# Patient Record
Sex: MALE | Race: ASIAN | NOT HISPANIC OR LATINO
[De-identification: names, ages, dates, MRNs, and addresses within clinical notes are randomized per-mention and may not be internally consistent; named-entity substitution may affect disease eponyms.]

---

## 2017-01-03 ENCOUNTER — APPOINTMENT (OUTPATIENT)
Dept: UROLOGY | Facility: CLINIC | Age: 47
End: 2017-01-03

## 2017-01-04 LAB — ALP BLD-CCNC: 139 U/L

## 2017-01-09 LAB
ALP BLD-CCNC: 129 U/L
ALP BONE CFR SERPL: 35 %
ALP INTEST CFR SERPL: 13 %
ALP LIVER CFR SERPL: 52 %
ALP MACRO CFR SERPL: 0 %
ALP PLAC CFR SERPL: 0 %

## 2017-01-18 ENCOUNTER — RESULT REVIEW (OUTPATIENT)
Age: 47
End: 2017-01-18

## 2017-01-22 ENCOUNTER — EMERGENCY (EMERGENCY)
Facility: HOSPITAL | Age: 47
LOS: 1 days | Discharge: HOME | End: 2017-01-22
Attending: EMERGENCY MEDICINE | Admitting: EMERGENCY MEDICINE
Payer: MEDICAID

## 2017-01-22 VITALS
DIASTOLIC BLOOD PRESSURE: 90 MMHG | SYSTOLIC BLOOD PRESSURE: 125 MMHG | OXYGEN SATURATION: 100 % | RESPIRATION RATE: 16 BRPM | HEART RATE: 73 BPM | TEMPERATURE: 98 F

## 2017-01-22 VITALS
RESPIRATION RATE: 18 BRPM | SYSTOLIC BLOOD PRESSURE: 110 MMHG | DIASTOLIC BLOOD PRESSURE: 67 MMHG | OXYGEN SATURATION: 100 % | TEMPERATURE: 98 F | HEART RATE: 62 BPM

## 2017-01-22 DIAGNOSIS — Z98.89 OTHER SPECIFIED POSTPROCEDURAL STATES: Chronic | ICD-10-CM

## 2017-01-22 LAB
APPEARANCE UR: CLEAR — SIGNIFICANT CHANGE UP
BILIRUB UR-MCNC: NEGATIVE — SIGNIFICANT CHANGE UP
BLOOD UR QL VISUAL: NEGATIVE — SIGNIFICANT CHANGE UP
COLOR SPEC: SIGNIFICANT CHANGE UP
GLUCOSE UR-MCNC: NEGATIVE — SIGNIFICANT CHANGE UP
KETONES UR-MCNC: NEGATIVE — SIGNIFICANT CHANGE UP
LEUKOCYTE ESTERASE UR-ACNC: NEGATIVE — SIGNIFICANT CHANGE UP
MUCOUS THREADS # UR AUTO: SIGNIFICANT CHANGE UP
NITRITE UR-MCNC: NEGATIVE — SIGNIFICANT CHANGE UP
PH UR: 6.5 — SIGNIFICANT CHANGE UP (ref 4.6–8)
PROT UR-MCNC: NEGATIVE — SIGNIFICANT CHANGE UP
RBC CASTS # UR COMP ASSIST: SIGNIFICANT CHANGE UP (ref 0–?)
SP GR SPEC: 1.01 — SIGNIFICANT CHANGE UP (ref 1–1.03)
UROBILINOGEN FLD QL: NORMAL E.U. — SIGNIFICANT CHANGE UP (ref 0.1–0.2)

## 2017-01-22 PROCEDURE — 99284 EMERGENCY DEPT VISIT MOD MDM: CPT

## 2017-01-22 PROCEDURE — 76870 US EXAM SCROTUM: CPT | Mod: 26

## 2017-01-22 RX ORDER — IBUPROFEN 200 MG
600 TABLET ORAL ONCE
Qty: 0 | Refills: 0 | Status: COMPLETED | OUTPATIENT
Start: 2017-01-22 | End: 2017-01-22

## 2017-01-22 RX ADMIN — Medication 600 MILLIGRAM(S): at 13:12

## 2017-01-22 NOTE — ED PROVIDER NOTE - MEDICAL DECISION MAKING DETAILS
47yo male with left testicular pain for months, gets worse at night, described as vibrations. No associated sx, pt has seen urology for the same complaint, normal exam, will check UA, US testicle to r/o mass. Hx not consistent with torsion, or incarcerated hernia, or epidymidis. Pt instructed to f/u with his urologist. 45yo male with left testicular pain for months, gets worse at night, described as vibrations. No associated sx, pt has seen urology for the same complaint, normal exam, will check UA, US testicle to r/o mass. Hx not consistent with torsion, or incarcerated hernia, or epidymidis. Pt instructed to f/u with his urologist. US consistent with hydrocele, possible epidymitis, however pt without pain on right, only on left, pt already on bactrim for possible prostitits, will not tx, will give pt copies of labs/US, pt instructed to f/u with his urologist.

## 2017-01-22 NOTE — ED PROVIDER NOTE - PHYSICAL EXAMINATION
No testicular swelling, mild pain to palpation over left testicle, no erythema, no penile discharge, no palpable masses in scrotum, no palpable hernia.

## 2017-01-22 NOTE — ED ADULT TRIAGE NOTE - CHIEF COMPLAINT QUOTE
Pt has been having testicular swelling x 2 months, c/o testicular pain/swelling increasingly worse since last night. Denies abnormal discharge, but felt chills last night. Recently dx with bacterial infection in stomach and placed on Sucralfate, Protonix, Levofloxacin and Clarithromycin.

## 2017-01-22 NOTE — ED PROVIDER NOTE - OBJECTIVE STATEMENT
47yo male hx of BPH, hx of peptic ulcer s/p endoscopy last week, started on triple antibiotic therapy, seen by a urologist for testicular swelling, given bactrim p/w worsening testicular pain. He notes testicular pain worsening at night. it gets better during the day. No associated n/v/d, no pain with urination, no decreased flatus. He has a hx of bladder surgery for a stone. He notes that he is currently pain free, but gets the pain at night, when resting. He works as a , he hasn't taken anything for the pain.

## 2017-01-23 ENCOUNTER — APPOINTMENT (OUTPATIENT)
Dept: UROLOGY | Facility: CLINIC | Age: 47
End: 2017-01-23

## 2017-02-08 ENCOUNTER — OUTPATIENT (OUTPATIENT)
Dept: OUTPATIENT SERVICES | Facility: HOSPITAL | Age: 47
LOS: 1 days | End: 2017-02-08
Payer: MEDICAID

## 2017-02-08 ENCOUNTER — APPOINTMENT (OUTPATIENT)
Dept: UROLOGY | Facility: CLINIC | Age: 47
End: 2017-02-08

## 2017-02-08 DIAGNOSIS — Z98.89 OTHER SPECIFIED POSTPROCEDURAL STATES: Chronic | ICD-10-CM

## 2017-02-08 PROCEDURE — 76870 US EXAM SCROTUM: CPT

## 2017-02-09 LAB
APPEARANCE: CLEAR
BACTERIA: NEGATIVE
BILIRUBIN URINE: NEGATIVE
BLOOD URINE: NEGATIVE
CALCIUM OXALATE CRYSTALS: ABNORMAL
COLOR: YELLOW
GLUCOSE QUALITATIVE U: NORMAL MG/DL
HYALINE CASTS: 0 /LPF
KETONES URINE: NEGATIVE
LEUKOCYTE ESTERASE URINE: NEGATIVE
MICROSCOPIC-UA: NORMAL
NITRITE URINE: NEGATIVE
PH URINE: 5.5
PROTEIN URINE: NEGATIVE MG/DL
RED BLOOD CELLS URINE: 2 /HPF
SPECIFIC GRAVITY URINE: 1.02
SQUAMOUS EPITHELIAL CELLS: 0 /HPF
UROBILINOGEN URINE: NORMAL MG/DL
WHITE BLOOD CELLS URINE: 0 /HPF

## 2017-02-10 LAB — BACTERIA UR CULT: NORMAL

## 2017-02-13 LAB — CORE LAB FLUID CYTOLOGY: NORMAL

## 2017-02-16 DIAGNOSIS — N45.1 EPIDIDYMITIS: ICD-10-CM

## 2017-02-16 DIAGNOSIS — R74.8 ABNORMAL LEVELS OF OTHER SERUM ENZYMES: ICD-10-CM

## 2017-02-22 ENCOUNTER — APPOINTMENT (OUTPATIENT)
Dept: UROLOGY | Facility: CLINIC | Age: 47
End: 2017-02-22

## 2017-02-22 LAB
APPEARANCE: ABNORMAL
BACTERIA: NEGATIVE
BILIRUBIN URINE: NEGATIVE
BLOOD URINE: ABNORMAL
CALCIUM OXALATE CRYSTALS: ABNORMAL
COLOR: YELLOW
GLUCOSE QUALITATIVE U: NORMAL MG/DL
HYALINE CASTS: 0 /LPF
KETONES URINE: NEGATIVE
LEUKOCYTE ESTERASE URINE: NEGATIVE
MICROSCOPIC-UA: NORMAL
NITRITE URINE: NEGATIVE
PH URINE: 6
PROTEIN URINE: 30 MG/DL
RED BLOOD CELLS URINE: 3 /HPF
SPECIFIC GRAVITY URINE: 1.03
SQUAMOUS EPITHELIAL CELLS: 0 /HPF
UROBILINOGEN URINE: NORMAL MG/DL
WHITE BLOOD CELLS URINE: 1 /HPF

## 2017-02-24 LAB
BACTERIA UR CULT: NORMAL
CORE LAB FLUID CYTOLOGY: NORMAL

## 2017-03-08 ENCOUNTER — APPOINTMENT (OUTPATIENT)
Dept: UROLOGY | Facility: CLINIC | Age: 47
End: 2017-03-08

## 2017-03-08 RX ORDER — GABAPENTIN 300 MG/1
300 CAPSULE ORAL
Qty: 60 | Refills: 11 | Status: ACTIVE | COMMUNITY
Start: 2017-02-22 | End: 1900-01-01

## 2017-03-15 LAB
ALP BLD-CCNC: 138 U/L
ALP BLD-CCNC: 141 U/L
ALP BONE CFR SERPL: 39 %
ALP INTEST CFR SERPL: 17 %
ALP LIVER CFR SERPL: 45 %
ALP MACRO CFR SERPL: 0 %
ALP PLAC CFR SERPL: 0 %

## 2017-03-22 ENCOUNTER — APPOINTMENT (OUTPATIENT)
Dept: UROLOGY | Facility: CLINIC | Age: 47
End: 2017-03-22

## 2017-04-03 ENCOUNTER — APPOINTMENT (OUTPATIENT)
Dept: UROLOGY | Facility: CLINIC | Age: 47
End: 2017-04-03

## 2017-05-09 ENCOUNTER — EMERGENCY (EMERGENCY)
Facility: HOSPITAL | Age: 47
LOS: 1 days | Discharge: HOME | End: 2017-05-09
Attending: EMERGENCY MEDICINE | Admitting: EMERGENCY MEDICINE
Payer: MEDICAID

## 2017-05-09 VITALS
DIASTOLIC BLOOD PRESSURE: 83 MMHG | SYSTOLIC BLOOD PRESSURE: 124 MMHG | OXYGEN SATURATION: 99 % | HEART RATE: 77 BPM | RESPIRATION RATE: 18 BRPM | TEMPERATURE: 98 F

## 2017-05-09 VITALS
SYSTOLIC BLOOD PRESSURE: 127 MMHG | TEMPERATURE: 98 F | DIASTOLIC BLOOD PRESSURE: 86 MMHG | HEART RATE: 66 BPM | OXYGEN SATURATION: 100 % | RESPIRATION RATE: 16 BRPM

## 2017-05-09 DIAGNOSIS — Z98.89 OTHER SPECIFIED POSTPROCEDURAL STATES: Chronic | ICD-10-CM

## 2017-05-09 LAB
ALBUMIN SERPL ELPH-MCNC: 4.5 G/DL — SIGNIFICANT CHANGE UP (ref 3.3–5)
ALP SERPL-CCNC: 122 U/L — HIGH (ref 40–120)
ALT FLD-CCNC: 35 U/L — SIGNIFICANT CHANGE UP (ref 4–41)
AST SERPL-CCNC: 31 U/L — SIGNIFICANT CHANGE UP (ref 4–40)
BASOPHILS # BLD AUTO: 0.04 K/UL — SIGNIFICANT CHANGE UP (ref 0–0.2)
BASOPHILS NFR BLD AUTO: 0.4 % — SIGNIFICANT CHANGE UP (ref 0–2)
BILIRUB SERPL-MCNC: 0.4 MG/DL — SIGNIFICANT CHANGE UP (ref 0.2–1.2)
BUN SERPL-MCNC: 10 MG/DL — SIGNIFICANT CHANGE UP (ref 7–23)
CALCIUM SERPL-MCNC: 9.5 MG/DL — SIGNIFICANT CHANGE UP (ref 8.4–10.5)
CHLORIDE SERPL-SCNC: 99 MMOL/L — SIGNIFICANT CHANGE UP (ref 98–107)
CK MB BLD-MCNC: 1 — SIGNIFICANT CHANGE UP (ref 0–2.5)
CK MB BLD-MCNC: 1.49 NG/ML — SIGNIFICANT CHANGE UP (ref 1–6.6)
CK SERPL-CCNC: 150 U/L — SIGNIFICANT CHANGE UP (ref 30–200)
CO2 SERPL-SCNC: 24 MMOL/L — SIGNIFICANT CHANGE UP (ref 22–31)
CREAT SERPL-MCNC: 0.82 MG/DL — SIGNIFICANT CHANGE UP (ref 0.5–1.3)
EOSINOPHIL # BLD AUTO: 0.17 K/UL — SIGNIFICANT CHANGE UP (ref 0–0.5)
EOSINOPHIL NFR BLD AUTO: 1.5 % — SIGNIFICANT CHANGE UP (ref 0–6)
GLUCOSE SERPL-MCNC: 84 MG/DL — SIGNIFICANT CHANGE UP (ref 70–99)
HCT VFR BLD CALC: 43.8 % — SIGNIFICANT CHANGE UP (ref 39–50)
HGB BLD-MCNC: 15.1 G/DL — SIGNIFICANT CHANGE UP (ref 13–17)
IMM GRANULOCYTES NFR BLD AUTO: 0.4 % — SIGNIFICANT CHANGE UP (ref 0–1.5)
LYMPHOCYTES # BLD AUTO: 27.4 % — SIGNIFICANT CHANGE UP (ref 13–44)
LYMPHOCYTES # BLD AUTO: 3.09 K/UL — SIGNIFICANT CHANGE UP (ref 1–3.3)
MCHC RBC-ENTMCNC: 29.8 PG — SIGNIFICANT CHANGE UP (ref 27–34)
MCHC RBC-ENTMCNC: 34.5 % — SIGNIFICANT CHANGE UP (ref 32–36)
MCV RBC AUTO: 86.6 FL — SIGNIFICANT CHANGE UP (ref 80–100)
MONOCYTES # BLD AUTO: 0.72 K/UL — SIGNIFICANT CHANGE UP (ref 0–0.9)
MONOCYTES NFR BLD AUTO: 6.4 % — SIGNIFICANT CHANGE UP (ref 2–14)
NEUTROPHILS # BLD AUTO: 7.22 K/UL — SIGNIFICANT CHANGE UP (ref 1.8–7.4)
NEUTROPHILS NFR BLD AUTO: 63.9 % — SIGNIFICANT CHANGE UP (ref 43–77)
PLATELET # BLD AUTO: 265 K/UL — SIGNIFICANT CHANGE UP (ref 150–400)
PMV BLD: 10.1 FL — SIGNIFICANT CHANGE UP (ref 7–13)
POTASSIUM SERPL-MCNC: 4.1 MMOL/L — SIGNIFICANT CHANGE UP (ref 3.5–5.3)
POTASSIUM SERPL-SCNC: 4.1 MMOL/L — SIGNIFICANT CHANGE UP (ref 3.5–5.3)
PROT SERPL-MCNC: 8.3 G/DL — SIGNIFICANT CHANGE UP (ref 6–8.3)
RBC # BLD: 5.06 M/UL — SIGNIFICANT CHANGE UP (ref 4.2–5.8)
RBC # FLD: 13.1 % — SIGNIFICANT CHANGE UP (ref 10.3–14.5)
SODIUM SERPL-SCNC: 136 MMOL/L — SIGNIFICANT CHANGE UP (ref 135–145)
TROPONIN T SERPL-MCNC: < 0.06 NG/ML — SIGNIFICANT CHANGE UP (ref 0–0.06)
WBC # BLD: 11.28 K/UL — HIGH (ref 3.8–10.5)
WBC # FLD AUTO: 11.28 K/UL — HIGH (ref 3.8–10.5)

## 2017-05-09 PROCEDURE — 93010 ELECTROCARDIOGRAM REPORT: CPT

## 2017-05-09 PROCEDURE — 70450 CT HEAD/BRAIN W/O DYE: CPT | Mod: 26

## 2017-05-09 PROCEDURE — 99285 EMERGENCY DEPT VISIT HI MDM: CPT | Mod: 25

## 2017-05-09 RX ORDER — ACETAMINOPHEN 500 MG
650 TABLET ORAL ONCE
Qty: 0 | Refills: 0 | Status: DISCONTINUED | OUTPATIENT
Start: 2017-05-09 | End: 2017-05-09

## 2017-05-09 RX ORDER — METOCLOPRAMIDE HCL 10 MG
10 TABLET ORAL ONCE
Qty: 0 | Refills: 0 | Status: COMPLETED | OUTPATIENT
Start: 2017-05-09 | End: 2017-05-09

## 2017-05-09 RX ORDER — ACETAMINOPHEN 500 MG
650 TABLET ORAL ONCE
Qty: 0 | Refills: 0 | Status: COMPLETED | OUTPATIENT
Start: 2017-05-09 | End: 2017-05-09

## 2017-05-09 RX ADMIN — Medication 10 MILLIGRAM(S): at 17:31

## 2017-05-09 RX ADMIN — Medication 650 MILLIGRAM(S): at 17:31

## 2017-05-09 NOTE — ED PROVIDER NOTE - OBJECTIVE STATEMENT
47 yo M with no PMHx here for headache since last night accompanied by dizziness. Denies f/c, vision changes, sob, cp, n/v, palpitations, diaphoresis, n/v, abdominal pain, dysuria, paresthesias, weakness.

## 2017-05-09 NOTE — ED ADULT NURSE NOTE - OBJECTIVE STATEMENT
pt states he is a  and a few days ago was sleeping in the car and his body started shaking, he c.o. "something moving in my head" and he started sweating. states he has had anxiety attacks in the past but this is different. pt c.o. lt sided headache and feeling off balance. + nausea, denies vomiting and head trauma. alert and oriented. medicated as ordered.

## 2017-05-24 ENCOUNTER — APPOINTMENT (OUTPATIENT)
Dept: UROLOGY | Facility: CLINIC | Age: 47
End: 2017-05-24

## 2017-05-28 LAB
ALBUMIN SERPL ELPH-MCNC: 4.5 G/DL
ALP BLD-CCNC: 138 U/L
ALP BLD-CCNC: 144 U/L
ALP BONE CFR SERPL: 37 %
ALP INTEST CFR SERPL: 23 %
ALP LIVER CFR SERPL: 40 %
ALP MACRO CFR SERPL: 0 %
ALP PLAC CFR SERPL: 0 %
ALT SERPL-CCNC: 32 U/L
ANION GAP SERPL CALC-SCNC: 15 MMOL/L
AST SERPL-CCNC: 27 U/L
BILIRUB SERPL-MCNC: 0.2 MG/DL
BUN SERPL-MCNC: 8 MG/DL
CALCIUM SERPL-MCNC: 10 MG/DL
CHLORIDE SERPL-SCNC: 103 MMOL/L
CO2 SERPL-SCNC: 24 MMOL/L
CREAT SERPL-MCNC: 0.87 MG/DL
GLUCOSE SERPL-MCNC: 107 MG/DL
POTASSIUM SERPL-SCNC: 4.4 MMOL/L
PROT SERPL-MCNC: 8.1 G/DL
SODIUM SERPL-SCNC: 142 MMOL/L

## 2017-06-07 ENCOUNTER — APPOINTMENT (OUTPATIENT)
Dept: UROLOGY | Facility: CLINIC | Age: 47
End: 2017-06-07

## 2017-06-07 DIAGNOSIS — K59.00 CONSTIPATION, UNSPECIFIED: ICD-10-CM

## 2017-06-21 ENCOUNTER — APPOINTMENT (OUTPATIENT)
Dept: UROLOGY | Facility: CLINIC | Age: 47
End: 2017-06-21

## 2017-07-20 ENCOUNTER — APPOINTMENT (OUTPATIENT)
Dept: UROLOGY | Facility: CLINIC | Age: 47
End: 2017-07-20

## 2017-08-07 ENCOUNTER — APPOINTMENT (OUTPATIENT)
Dept: UROLOGY | Facility: CLINIC | Age: 47
End: 2017-08-07
Payer: MEDICAID

## 2017-08-07 ENCOUNTER — OUTPATIENT (OUTPATIENT)
Dept: OUTPATIENT SERVICES | Facility: HOSPITAL | Age: 47
LOS: 1 days | End: 2017-08-07
Payer: MEDICAID

## 2017-08-07 DIAGNOSIS — Z98.89 OTHER SPECIFIED POSTPROCEDURAL STATES: Chronic | ICD-10-CM

## 2017-08-07 DIAGNOSIS — R35.0 FREQUENCY OF MICTURITION: ICD-10-CM

## 2017-08-07 DIAGNOSIS — R10.9 UNSPECIFIED ABDOMINAL PAIN: ICD-10-CM

## 2017-08-07 PROCEDURE — 99214 OFFICE O/P EST MOD 30 MIN: CPT | Mod: 25

## 2017-08-07 PROCEDURE — 76870 US EXAM SCROTUM: CPT | Mod: 26

## 2017-08-07 PROCEDURE — 76870 US EXAM SCROTUM: CPT

## 2017-08-14 DIAGNOSIS — R10.2 PELVIC AND PERINEAL PAIN: ICD-10-CM

## 2017-08-14 DIAGNOSIS — N50.819 TESTICULAR PAIN, UNSPECIFIED: ICD-10-CM

## 2017-08-14 DIAGNOSIS — N41.0 ACUTE PROSTATITIS: ICD-10-CM

## 2017-08-14 DIAGNOSIS — N43.3 HYDROCELE, UNSPECIFIED: ICD-10-CM

## 2017-08-14 DIAGNOSIS — N45.1 EPIDIDYMITIS: ICD-10-CM

## 2017-09-30 ENCOUNTER — RESULT REVIEW (OUTPATIENT)
Age: 47
End: 2017-09-30

## 2017-10-03 ENCOUNTER — APPOINTMENT (OUTPATIENT)
Dept: CT IMAGING | Facility: IMAGING CENTER | Age: 47
End: 2017-10-03

## 2017-10-31 ENCOUNTER — APPOINTMENT (OUTPATIENT)
Dept: CT IMAGING | Facility: IMAGING CENTER | Age: 47
End: 2017-10-31
Payer: MEDICAID

## 2017-10-31 ENCOUNTER — OUTPATIENT (OUTPATIENT)
Dept: OUTPATIENT SERVICES | Facility: HOSPITAL | Age: 47
LOS: 1 days | End: 2017-10-31
Payer: MEDICAID

## 2017-10-31 DIAGNOSIS — Z00.8 ENCOUNTER FOR OTHER GENERAL EXAMINATION: ICD-10-CM

## 2017-10-31 DIAGNOSIS — Z98.89 OTHER SPECIFIED POSTPROCEDURAL STATES: Chronic | ICD-10-CM

## 2017-10-31 PROCEDURE — 74177 CT ABD & PELVIS W/CONTRAST: CPT | Mod: 26

## 2017-10-31 PROCEDURE — 74177 CT ABD & PELVIS W/CONTRAST: CPT

## 2017-11-08 ENCOUNTER — APPOINTMENT (OUTPATIENT)
Dept: UROLOGY | Facility: CLINIC | Age: 47
End: 2017-11-08
Payer: MEDICAID

## 2017-11-08 VITALS
DIASTOLIC BLOOD PRESSURE: 73 MMHG | SYSTOLIC BLOOD PRESSURE: 117 MMHG | HEIGHT: 67 IN | HEART RATE: 57 BPM | WEIGHT: 188 LBS | RESPIRATION RATE: 15 BRPM | TEMPERATURE: 97.8 F | BODY MASS INDEX: 29.51 KG/M2

## 2017-11-08 DIAGNOSIS — F41.9 ANXIETY DISORDER, UNSPECIFIED: ICD-10-CM

## 2017-11-08 DIAGNOSIS — R74.8 ABNORMAL LEVELS OF OTHER SERUM ENZYMES: ICD-10-CM

## 2017-11-08 LAB
ALBUMIN SERPL ELPH-MCNC: 4.5 G/DL
ALP BLD-CCNC: 139 U/L
ALT SERPL-CCNC: 40 U/L
AST SERPL-CCNC: 28 U/L
BASOPHILS # BLD AUTO: 0.05 K/UL
BASOPHILS NFR BLD AUTO: 0.6 %
BILIRUB SERPL-MCNC: 0.5 MG/DL
BUN SERPL-MCNC: 10 MG/DL
CALCIUM SERPL-MCNC: 10.1 MG/DL
CHLORIDE SERPL-SCNC: 100 MMOL/L
CO2 SERPL-SCNC: 25 MMOL/L
CREAT SERPL-MCNC: 0.9 MG/DL
EOSINOPHIL # BLD AUTO: 0.24 K/UL
EOSINOPHIL NFR BLD AUTO: 2.7 %
GLUCOSE SERPL-MCNC: 94 MG/DL
HCT VFR BLD CALC: 43.2 %
HGB BLD-MCNC: 14.6 G/DL
IMM GRANULOCYTES NFR BLD AUTO: 0.3 %
LYMPHOCYTES # BLD AUTO: 3.24 K/UL
LYMPHOCYTES NFR BLD AUTO: 36.4 %
MAN DIFF?: NORMAL
MCHC RBC-ENTMCNC: 29.5 PG
MCHC RBC-ENTMCNC: 33.8 GM/DL
MCV RBC AUTO: 87.3 FL
MONOCYTES # BLD AUTO: 0.47 K/UL
MONOCYTES NFR BLD AUTO: 5.3 %
NEUTROPHILS # BLD AUTO: 4.86 K/UL
NEUTROPHILS NFR BLD AUTO: 54.7 %
PLATELET # BLD AUTO: 267 K/UL
POTASSIUM SERPL-SCNC: 4.3 MMOL/L
PROT SERPL-MCNC: 8.2 G/DL
PSA SERPL-MCNC: 1.25 NG/ML
RBC # BLD: 4.95 M/UL
RBC # FLD: 13.1 %
SODIUM SERPL-SCNC: 141 MMOL/L
TESTOST SERPL-MCNC: 401.5 NG/DL
WBC # FLD AUTO: 8.89 K/UL

## 2017-11-08 PROCEDURE — 99214 OFFICE O/P EST MOD 30 MIN: CPT

## 2017-11-09 LAB
APPEARANCE: CLEAR
BACTERIA: NEGATIVE
BILIRUBIN URINE: NEGATIVE
BLOOD URINE: NEGATIVE
COLOR: YELLOW
GLUCOSE QUALITATIVE U: NEGATIVE MG/DL
KETONES URINE: NEGATIVE
LEUKOCYTE ESTERASE URINE: NEGATIVE
MICROSCOPIC-UA: NORMAL
NITRITE URINE: NEGATIVE
PH URINE: 7
PROTEIN URINE: NEGATIVE MG/DL
RED BLOOD CELLS URINE: 2 /HPF
SPECIFIC GRAVITY URINE: 1.01
SQUAMOUS EPITHELIAL CELLS: 0 /HPF
UROBILINOGEN URINE: NEGATIVE MG/DL
WHITE BLOOD CELLS URINE: 0 /HPF

## 2017-11-10 LAB
BACTERIA UR CULT: NORMAL
CORE LAB FLUID CYTOLOGY: NORMAL

## 2017-11-14 LAB
ALP BLD-CCNC: 130 U/L
ALP BONE CFR SERPL: 38 %
ALP INTEST CFR SERPL: 13 %
ALP LIVER CFR SERPL: 50 %
ALP MACRO CFR SERPL: 0 %
ALP PLAC CFR SERPL: 0 %

## 2017-12-13 ENCOUNTER — APPOINTMENT (OUTPATIENT)
Dept: UROLOGY | Facility: CLINIC | Age: 47
End: 2017-12-13

## 2023-09-11 ENCOUNTER — INPATIENT (INPATIENT)
Facility: HOSPITAL | Age: 53
LOS: 1 days | Discharge: HOME | End: 2023-09-13
Attending: HOSPITALIST | Admitting: HOSPITALIST
Payer: MEDICAID

## 2023-09-11 VITALS
DIASTOLIC BLOOD PRESSURE: 87 MMHG | OXYGEN SATURATION: 100 % | TEMPERATURE: 98 F | SYSTOLIC BLOOD PRESSURE: 141 MMHG | HEART RATE: 73 BPM | RESPIRATION RATE: 18 BRPM

## 2023-09-11 DIAGNOSIS — Z98.89 OTHER SPECIFIED POSTPROCEDURAL STATES: Chronic | ICD-10-CM

## 2023-09-11 LAB
ALBUMIN SERPL ELPH-MCNC: 5.1 G/DL — HIGH (ref 3.3–5)
ALP SERPL-CCNC: 151 U/L — HIGH (ref 40–120)
ALT FLD-CCNC: 33 U/L — SIGNIFICANT CHANGE UP (ref 4–41)
ANION GAP SERPL CALC-SCNC: 11 MMOL/L — SIGNIFICANT CHANGE UP (ref 7–14)
AST SERPL-CCNC: 24 U/L — SIGNIFICANT CHANGE UP (ref 4–40)
BILIRUB SERPL-MCNC: 0.3 MG/DL — SIGNIFICANT CHANGE UP (ref 0.2–1.2)
BUN SERPL-MCNC: 7 MG/DL — SIGNIFICANT CHANGE UP (ref 7–23)
CALCIUM SERPL-MCNC: 9.6 MG/DL — SIGNIFICANT CHANGE UP (ref 8.4–10.5)
CHLORIDE SERPL-SCNC: 104 MMOL/L — SIGNIFICANT CHANGE UP (ref 98–107)
CO2 SERPL-SCNC: 28 MMOL/L — SIGNIFICANT CHANGE UP (ref 22–31)
CREAT SERPL-MCNC: 0.76 MG/DL — SIGNIFICANT CHANGE UP (ref 0.5–1.3)
EGFR: 109 ML/MIN/1.73M2 — SIGNIFICANT CHANGE UP
GLUCOSE SERPL-MCNC: 102 MG/DL — HIGH (ref 70–99)
HCT VFR BLD CALC: 43 % — SIGNIFICANT CHANGE UP (ref 39–50)
HGB BLD-MCNC: 14.6 G/DL — SIGNIFICANT CHANGE UP (ref 13–17)
MCHC RBC-ENTMCNC: 29.5 PG — SIGNIFICANT CHANGE UP (ref 27–34)
MCHC RBC-ENTMCNC: 34 GM/DL — SIGNIFICANT CHANGE UP (ref 32–36)
MCV RBC AUTO: 86.9 FL — SIGNIFICANT CHANGE UP (ref 80–100)
NRBC # BLD: 0 /100 WBCS — SIGNIFICANT CHANGE UP (ref 0–0)
NRBC # FLD: 0 K/UL — SIGNIFICANT CHANGE UP (ref 0–0)
PLATELET # BLD AUTO: 281 K/UL — SIGNIFICANT CHANGE UP (ref 150–400)
POTASSIUM SERPL-MCNC: 4.2 MMOL/L — SIGNIFICANT CHANGE UP (ref 3.5–5.3)
POTASSIUM SERPL-SCNC: 4.2 MMOL/L — SIGNIFICANT CHANGE UP (ref 3.5–5.3)
PROT SERPL-MCNC: 8.8 G/DL — HIGH (ref 6–8.3)
RBC # BLD: 4.95 M/UL — SIGNIFICANT CHANGE UP (ref 4.2–5.8)
RBC # FLD: 12.5 % — SIGNIFICANT CHANGE UP (ref 10.3–14.5)
SODIUM SERPL-SCNC: 143 MMOL/L — SIGNIFICANT CHANGE UP (ref 135–145)
TROPONIN T, HIGH SENSITIVITY RESULT: <6 NG/L — SIGNIFICANT CHANGE UP
TSH SERPL-MCNC: 1.06 UIU/ML — SIGNIFICANT CHANGE UP (ref 0.27–4.2)
WBC # BLD: 11.28 K/UL — HIGH (ref 3.8–10.5)
WBC # FLD AUTO: 11.28 K/UL — HIGH (ref 3.8–10.5)

## 2023-09-11 PROCEDURE — 71046 X-RAY EXAM CHEST 2 VIEWS: CPT | Mod: 26

## 2023-09-11 PROCEDURE — 99285 EMERGENCY DEPT VISIT HI MDM: CPT

## 2023-09-11 RX ORDER — ASPIRIN/CALCIUM CARB/MAGNESIUM 324 MG
324 TABLET ORAL ONCE
Refills: 0 | Status: COMPLETED | OUTPATIENT
Start: 2023-09-11 | End: 2023-09-11

## 2023-09-11 RX ORDER — SODIUM CHLORIDE 9 MG/ML
1000 INJECTION INTRAMUSCULAR; INTRAVENOUS; SUBCUTANEOUS ONCE
Refills: 0 | Status: COMPLETED | OUTPATIENT
Start: 2023-09-11 | End: 2023-09-11

## 2023-09-11 RX ADMIN — SODIUM CHLORIDE 1000 MILLILITER(S): 9 INJECTION INTRAMUSCULAR; INTRAVENOUS; SUBCUTANEOUS at 22:46

## 2023-09-11 RX ADMIN — Medication 324 MILLIGRAM(S): at 22:46

## 2023-09-11 RX ADMIN — SODIUM CHLORIDE 1000 MILLILITER(S): 9 INJECTION INTRAMUSCULAR; INTRAVENOUS; SUBCUTANEOUS at 22:38

## 2023-09-11 NOTE — ED PROVIDER NOTE - OBJECTIVE STATEMENT
Petty: 1 wk dizziness and sweating when walking. Fatigue. Improved w/ rest and bowel/bladder movements. Not likely PNA: no infectious Sx (eg, purulent cough). Not likely PE or other VTE: PERC or Wells low score, EKG no e/o R-heart strain. 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 At last PCP visit, BG and lipids were a bit high, but not enough to warrant meds. No relevant PMH, PSH, or FHx. No significant T/E/D. No allergies. No CP/SOB. Petty: 1 wk dizziness (not vertigo) and sweating when walking, worse today. Fatigue. Improved w/ rest and bowel/bladder movements. Not likely PNA: no infectious Sx (eg, purulent cough). Not likely PE or other VTE: PERC or Wells low score, EKG no e/o R-heart strain. 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 At last PCP visit, BG and lipids were a bit high, but not enough to warrant meds. No relevant PMH, PSH, or FHx. No significant T/E/D. No allergies. No CP/SOB.

## 2023-09-11 NOTE — ED ADULT NURSE NOTE - OBJECTIVE STATEMENT
54 y/o M presents to ED intake A&Ox4 c/o dizziness x 2 weeks. endorsing dizziness, abd "burning,". denies n/v/d, weakness, fevers, chills, c/p, SOB. respirations even and unlabored. no acute distress noted. 20G to LAC, labs drawn and sent. medicated as per orders. awaiting XR results.

## 2023-09-11 NOTE — ED ADULT TRIAGE NOTE - CHIEF COMPLAINT QUOTE
Problem: Discharge Planning  Goal: Discharge Planning (Adult, OB, Behavioral, Peds)  ROOM # 225     Living Situation (if not independent, order SW consult): Ind at home  Facility name:  : [facility] [phone]     Activity level at baseline: Ind w/walker  Activity level on admit: SBA        Patient registered to observation; given Patient Bill of Rights; given the opportunity to ask questions about observation status and their plan of care.  Patient has been oriented to the observation room, bathroom and call light is in place.     Discussed discharge goals and expectations with patient/family.                  pt c/o dizziness x 3 days.  pt also c/o generalized weakness and diaphoresis.  Hx: denies

## 2023-09-11 NOTE — ED PROVIDER NOTE - PROGRESS NOTE DETAILS
HARDIK Perez: pt signed out to me pending labs and admission, unable to reach Dr.Lau turner doc, spoke with hospitalist, requesting RVP and d-dimer prior to admission

## 2023-09-11 NOTE — ED PROVIDER NOTE - CLINICAL SUMMARY MEDICAL DECISION MAKING FREE TEXT BOX
Petty: Concern for anginal equivalent: dizzy, sweaty while walking. Check trop, EKG, CXR. Admit for echo, stress, Cards consult.

## 2023-09-12 DIAGNOSIS — R61 GENERALIZED HYPERHIDROSIS: ICD-10-CM

## 2023-09-12 DIAGNOSIS — Z29.9 ENCOUNTER FOR PROPHYLACTIC MEASURES, UNSPECIFIED: ICD-10-CM

## 2023-09-12 DIAGNOSIS — I20.9 ANGINA PECTORIS, UNSPECIFIED: ICD-10-CM

## 2023-09-12 DIAGNOSIS — E78.5 HYPERLIPIDEMIA, UNSPECIFIED: ICD-10-CM

## 2023-09-12 LAB
ANION GAP SERPL CALC-SCNC: 11 MMOL/L — SIGNIFICANT CHANGE UP (ref 7–14)
B PERT DNA SPEC QL NAA+PROBE: SIGNIFICANT CHANGE UP
B PERT+PARAPERT DNA PNL SPEC NAA+PROBE: SIGNIFICANT CHANGE UP
BORDETELLA PARAPERTUSSIS (RAPRVP): SIGNIFICANT CHANGE UP
BUN SERPL-MCNC: 8 MG/DL — SIGNIFICANT CHANGE UP (ref 7–23)
C PNEUM DNA SPEC QL NAA+PROBE: SIGNIFICANT CHANGE UP
CALCIUM SERPL-MCNC: 8.5 MG/DL — SIGNIFICANT CHANGE UP (ref 8.4–10.5)
CHLORIDE SERPL-SCNC: 105 MMOL/L — SIGNIFICANT CHANGE UP (ref 98–107)
CO2 SERPL-SCNC: 23 MMOL/L — SIGNIFICANT CHANGE UP (ref 22–31)
CREAT SERPL-MCNC: 0.67 MG/DL — SIGNIFICANT CHANGE UP (ref 0.5–1.3)
D DIMER BLD IA.RAPID-MCNC: <150 NG/ML DDU — SIGNIFICANT CHANGE UP
EGFR: 113 ML/MIN/1.73M2 — SIGNIFICANT CHANGE UP
FLUAV SUBTYP SPEC NAA+PROBE: SIGNIFICANT CHANGE UP
FLUBV RNA SPEC QL NAA+PROBE: SIGNIFICANT CHANGE UP
GLUCOSE SERPL-MCNC: 173 MG/DL — HIGH (ref 70–99)
HADV DNA SPEC QL NAA+PROBE: SIGNIFICANT CHANGE UP
HCOV 229E RNA SPEC QL NAA+PROBE: SIGNIFICANT CHANGE UP
HCOV HKU1 RNA SPEC QL NAA+PROBE: SIGNIFICANT CHANGE UP
HCOV NL63 RNA SPEC QL NAA+PROBE: SIGNIFICANT CHANGE UP
HCOV OC43 RNA SPEC QL NAA+PROBE: SIGNIFICANT CHANGE UP
HCT VFR BLD CALC: 42 % — SIGNIFICANT CHANGE UP (ref 39–50)
HGB BLD-MCNC: 14.1 G/DL — SIGNIFICANT CHANGE UP (ref 13–17)
HMPV RNA SPEC QL NAA+PROBE: SIGNIFICANT CHANGE UP
HPIV1 RNA SPEC QL NAA+PROBE: SIGNIFICANT CHANGE UP
HPIV2 RNA SPEC QL NAA+PROBE: SIGNIFICANT CHANGE UP
HPIV3 RNA SPEC QL NAA+PROBE: SIGNIFICANT CHANGE UP
HPIV4 RNA SPEC QL NAA+PROBE: SIGNIFICANT CHANGE UP
M PNEUMO DNA SPEC QL NAA+PROBE: SIGNIFICANT CHANGE UP
MAGNESIUM SERPL-MCNC: 2 MG/DL — SIGNIFICANT CHANGE UP (ref 1.6–2.6)
MCHC RBC-ENTMCNC: 29.3 PG — SIGNIFICANT CHANGE UP (ref 27–34)
MCHC RBC-ENTMCNC: 33.6 GM/DL — SIGNIFICANT CHANGE UP (ref 32–36)
MCV RBC AUTO: 87.1 FL — SIGNIFICANT CHANGE UP (ref 80–100)
NRBC # BLD: 0 /100 WBCS — SIGNIFICANT CHANGE UP (ref 0–0)
NRBC # FLD: 0 K/UL — SIGNIFICANT CHANGE UP (ref 0–0)
PLATELET # BLD AUTO: 272 K/UL — SIGNIFICANT CHANGE UP (ref 150–400)
POTASSIUM SERPL-MCNC: 4 MMOL/L — SIGNIFICANT CHANGE UP (ref 3.5–5.3)
POTASSIUM SERPL-SCNC: 4 MMOL/L — SIGNIFICANT CHANGE UP (ref 3.5–5.3)
RAPID RVP RESULT: SIGNIFICANT CHANGE UP
RBC # BLD: 4.82 M/UL — SIGNIFICANT CHANGE UP (ref 4.2–5.8)
RBC # FLD: 12.8 % — SIGNIFICANT CHANGE UP (ref 10.3–14.5)
RSV RNA SPEC QL NAA+PROBE: SIGNIFICANT CHANGE UP
RV+EV RNA SPEC QL NAA+PROBE: SIGNIFICANT CHANGE UP
SARS-COV-2 RNA SPEC QL NAA+PROBE: SIGNIFICANT CHANGE UP
SODIUM SERPL-SCNC: 139 MMOL/L — SIGNIFICANT CHANGE UP (ref 135–145)
TROPONIN T, HIGH SENSITIVITY RESULT: <6 NG/L — SIGNIFICANT CHANGE UP
WBC # BLD: 8.71 K/UL — SIGNIFICANT CHANGE UP (ref 3.8–10.5)
WBC # FLD AUTO: 8.71 K/UL — SIGNIFICANT CHANGE UP (ref 3.8–10.5)

## 2023-09-12 PROCEDURE — 99222 1ST HOSP IP/OBS MODERATE 55: CPT

## 2023-09-12 RX ORDER — ATORVASTATIN CALCIUM 80 MG/1
80 TABLET, FILM COATED ORAL AT BEDTIME
Refills: 0 | Status: DISCONTINUED | OUTPATIENT
Start: 2023-09-12 | End: 2023-09-13

## 2023-09-12 RX ORDER — LANOLIN ALCOHOL/MO/W.PET/CERES
3 CREAM (GRAM) TOPICAL AT BEDTIME
Refills: 0 | Status: DISCONTINUED | OUTPATIENT
Start: 2023-09-12 | End: 2023-09-13

## 2023-09-12 RX ORDER — ENOXAPARIN SODIUM 100 MG/ML
40 INJECTION SUBCUTANEOUS EVERY 24 HOURS
Refills: 0 | Status: DISCONTINUED | OUTPATIENT
Start: 2023-09-12 | End: 2023-09-13

## 2023-09-12 RX ORDER — ASPIRIN/CALCIUM CARB/MAGNESIUM 324 MG
81 TABLET ORAL DAILY
Refills: 0 | Status: DISCONTINUED | OUTPATIENT
Start: 2023-09-12 | End: 2023-09-13

## 2023-09-12 RX ORDER — ONDANSETRON 8 MG/1
4 TABLET, FILM COATED ORAL EVERY 8 HOURS
Refills: 0 | Status: DISCONTINUED | OUTPATIENT
Start: 2023-09-12 | End: 2023-09-13

## 2023-09-12 RX ORDER — ACETAMINOPHEN 500 MG
650 TABLET ORAL EVERY 6 HOURS
Refills: 0 | Status: DISCONTINUED | OUTPATIENT
Start: 2023-09-12 | End: 2023-09-13

## 2023-09-12 RX ADMIN — ATORVASTATIN CALCIUM 80 MILLIGRAM(S): 80 TABLET, FILM COATED ORAL at 16:34

## 2023-09-12 RX ADMIN — ATORVASTATIN CALCIUM 80 MILLIGRAM(S): 80 TABLET, FILM COATED ORAL at 21:50

## 2023-09-12 RX ADMIN — Medication 81 MILLIGRAM(S): at 16:34

## 2023-09-12 NOTE — H&P ADULT - ASSESSMENT
53M with HLD present with exertional diaphoresis, dizziness and tiredness.  Patient was at work and exerting himself and started feeling sweaty and weak all of a sudden.  Now admitted for possible high grade angina equivalent.

## 2023-09-12 NOTE — H&P ADULT - PROBLEM SELECTOR PLAN 1
Currently asymptomatic. trop negative x1. HEART score at least 3 (awaiting EKG for final determination)  -Trend trop x1 more time  -EKG pending  -Echo pending  -Stress test pending  -Depending on Echo and NST, may dispo home  -Check A1C, TSH and lipid level Currently asymptomatic. trop negative x1. HEART score at least 3 (awaiting EKG for final determination)  -Trend trop x1 more time  -EKG pending  -Echo pending  -Stress test pending  -A1C 6.3

## 2023-09-12 NOTE — H&P ADULT - NSHPLABSRESULTS_GEN_ALL_CORE
09-11    143  |  104  |  7   ----------------------------<  102<H>  4.2   |  28  |  0.76    Ca    9.6      11 Sep 2023 22:36    TPro  8.8<H>  /  Alb  5.1<H>  /  TBili  0.3  /  DBili  x   /  AST  24  /  ALT  33  /  AlkPhos  151<H>  09-11               14.6   11.28 )-----------( 281      ( 11 Sep 2023 22:36 )             43.0     LIVER FUNCTIONS - ( 11 Sep 2023 22:36 )  Alb: 5.1 g/dL / Pro: 8.8 g/dL / ALK PHOS: 151 U/L / ALT: 33 U/L / AST: 24 U/L / GGT: x           Urinalysis Basic - ( 11 Sep 2023 22:36 )    Color: x / Appearance: x / SG: x / pH: x  Gluc: 102 mg/dL / Ketone: x  / Bili: x / Urobili: x   Blood: x / Protein: x / Nitrite: x   Leuk Esterase: x / RBC: x / WBC x   Sq Epi: x / Non Sq Epi: x / Bacteria: x    EKG: Repeat pending    Image: CXR clear 09-11    143  |  104  |  7   ----------------------------<  102<H>  4.2   |  28  |  0.76    Ca    9.6      11 Sep 2023 22:36    TPro  8.8<H>  /  Alb  5.1<H>  /  TBili  0.3  /  DBili  x   /  AST  24  /  ALT  33  /  AlkPhos  151<H>  09-11               14.6   11.28 )-----------( 281      ( 11 Sep 2023 22:36 )             43.0     LIVER FUNCTIONS - ( 11 Sep 2023 22:36 )  Alb: 5.1 g/dL / Pro: 8.8 g/dL / ALK PHOS: 151 U/L / ALT: 33 U/L / AST: 24 U/L / GGT: x           Urinalysis Basic - ( 11 Sep 2023 22:36 )    Color: x / Appearance: x / SG: x / pH: x  Gluc: 102 mg/dL / Ketone: x  / Bili: x / Urobili: x   Blood: x / Protein: x / Nitrite: x   Leuk Esterase: x / RBC: x / WBC x   Sq Epi: x / Non Sq Epi: x / Bacteria: x    EKG: Sinus arrythmia.     Image: CXR clear

## 2023-09-12 NOTE — H&P ADULT - HISTORY OF PRESENT ILLNESS
53M with HLD present with exertional diaphoresis, dizziness and tiredness.  Patient was at work and exerting himself and started feeling sweaty and weak all of a sudden.  Patient had another episode 2 weeks ago when he was walking around started having the same symptoms but alleviated with rest. Patient also endorsed having a BM when he wasn't feeling well. His father had 3 MIs starting age 60s. No smoking history. Denies chest pain, dyspnea, diarrhea, fever, chills, abdominal pain, headaches, syncope or room spinning. He has been having some decreased energy level.  In the ED, VSS. EKG cannot be located. CXR clear. Currently asymptomatic. Trop and D-dimer negative

## 2023-09-13 ENCOUNTER — TRANSCRIPTION ENCOUNTER (OUTPATIENT)
Age: 53
End: 2023-09-13

## 2023-09-13 VITALS
SYSTOLIC BLOOD PRESSURE: 107 MMHG | RESPIRATION RATE: 19 BRPM | OXYGEN SATURATION: 98 % | TEMPERATURE: 98 F | DIASTOLIC BLOOD PRESSURE: 72 MMHG | HEART RATE: 66 BPM

## 2023-09-13 LAB
ALBUMIN SERPL ELPH-MCNC: 4.2 G/DL — SIGNIFICANT CHANGE UP (ref 3.3–5)
ALP SERPL-CCNC: 123 U/L — HIGH (ref 40–120)
ALT FLD-CCNC: 31 U/L — SIGNIFICANT CHANGE UP (ref 4–41)
ANION GAP SERPL CALC-SCNC: 13 MMOL/L — SIGNIFICANT CHANGE UP (ref 7–14)
APTT BLD: 26.5 SEC — SIGNIFICANT CHANGE UP (ref 24.5–35.6)
AST SERPL-CCNC: 26 U/L — SIGNIFICANT CHANGE UP (ref 4–40)
BILIRUB SERPL-MCNC: 0.5 MG/DL — SIGNIFICANT CHANGE UP (ref 0.2–1.2)
BUN SERPL-MCNC: 10 MG/DL — SIGNIFICANT CHANGE UP (ref 7–23)
CALCIUM SERPL-MCNC: 8.8 MG/DL — SIGNIFICANT CHANGE UP (ref 8.4–10.5)
CHLORIDE SERPL-SCNC: 103 MMOL/L — SIGNIFICANT CHANGE UP (ref 98–107)
CO2 SERPL-SCNC: 23 MMOL/L — SIGNIFICANT CHANGE UP (ref 22–31)
CREAT SERPL-MCNC: 0.78 MG/DL — SIGNIFICANT CHANGE UP (ref 0.5–1.3)
EGFR: 108 ML/MIN/1.73M2 — SIGNIFICANT CHANGE UP
GLUCOSE SERPL-MCNC: 195 MG/DL — HIGH (ref 70–99)
HCT VFR BLD CALC: 41.6 % — SIGNIFICANT CHANGE UP (ref 39–50)
HGB BLD-MCNC: 14.2 G/DL — SIGNIFICANT CHANGE UP (ref 13–17)
INR BLD: 0.96 RATIO — SIGNIFICANT CHANGE UP (ref 0.85–1.18)
MAGNESIUM SERPL-MCNC: 2.1 MG/DL — SIGNIFICANT CHANGE UP (ref 1.6–2.6)
MCHC RBC-ENTMCNC: 29.5 PG — SIGNIFICANT CHANGE UP (ref 27–34)
MCHC RBC-ENTMCNC: 34.1 GM/DL — SIGNIFICANT CHANGE UP (ref 32–36)
MCV RBC AUTO: 86.5 FL — SIGNIFICANT CHANGE UP (ref 80–100)
NRBC # BLD: 0 /100 WBCS — SIGNIFICANT CHANGE UP (ref 0–0)
NRBC # FLD: 0 K/UL — SIGNIFICANT CHANGE UP (ref 0–0)
PLATELET # BLD AUTO: 253 K/UL — SIGNIFICANT CHANGE UP (ref 150–400)
POTASSIUM SERPL-MCNC: 3.9 MMOL/L — SIGNIFICANT CHANGE UP (ref 3.5–5.3)
POTASSIUM SERPL-SCNC: 3.9 MMOL/L — SIGNIFICANT CHANGE UP (ref 3.5–5.3)
PROT SERPL-MCNC: 7.5 G/DL — SIGNIFICANT CHANGE UP (ref 6–8.3)
PROTHROM AB SERPL-ACNC: 10.9 SEC — SIGNIFICANT CHANGE UP (ref 9.5–13)
RBC # BLD: 4.81 M/UL — SIGNIFICANT CHANGE UP (ref 4.2–5.8)
RBC # FLD: 12.5 % — SIGNIFICANT CHANGE UP (ref 10.3–14.5)
SODIUM SERPL-SCNC: 139 MMOL/L — SIGNIFICANT CHANGE UP (ref 135–145)
TROPONIN T, HIGH SENSITIVITY RESULT: <6 NG/L — SIGNIFICANT CHANGE UP
WBC # BLD: 8.92 K/UL — SIGNIFICANT CHANGE UP (ref 3.8–10.5)
WBC # FLD AUTO: 8.92 K/UL — SIGNIFICANT CHANGE UP (ref 3.8–10.5)

## 2023-09-13 PROCEDURE — 93018 CV STRESS TEST I&R ONLY: CPT | Mod: GC

## 2023-09-13 PROCEDURE — 93306 TTE W/DOPPLER COMPLETE: CPT | Mod: 26

## 2023-09-13 PROCEDURE — 78452 HT MUSCLE IMAGE SPECT MULT: CPT | Mod: 26

## 2023-09-13 PROCEDURE — 99239 HOSP IP/OBS DSCHRG MGMT >30: CPT

## 2023-09-13 PROCEDURE — 93016 CV STRESS TEST SUPVJ ONLY: CPT | Mod: GC

## 2023-09-13 RX ORDER — ASPIRIN/CALCIUM CARB/MAGNESIUM 324 MG
1 TABLET ORAL
Qty: 30 | Refills: 0
Start: 2023-09-13 | End: 2023-10-12

## 2023-09-13 RX ORDER — ATORVASTATIN CALCIUM 80 MG/1
1 TABLET, FILM COATED ORAL
Qty: 30 | Refills: 0
Start: 2023-09-13 | End: 2023-10-12

## 2023-09-13 RX ORDER — INFLUENZA VIRUS VACCINE 15; 15; 15; 15 UG/.5ML; UG/.5ML; UG/.5ML; UG/.5ML
0.5 SUSPENSION INTRAMUSCULAR ONCE
Refills: 0 | Status: DISCONTINUED | OUTPATIENT
Start: 2023-09-13 | End: 2023-09-13

## 2023-09-13 RX ORDER — ATORVASTATIN CALCIUM 80 MG/1
40 TABLET, FILM COATED ORAL AT BEDTIME
Refills: 0 | Status: DISCONTINUED | OUTPATIENT
Start: 2023-09-13 | End: 2023-09-13

## 2023-09-13 RX ADMIN — Medication 81 MILLIGRAM(S): at 12:55

## 2023-09-13 NOTE — PROGRESS NOTE ADULT - PROBLEM SELECTOR PLAN 1
Currently asymptomatic. trop negative. HEART score at least 3 (awaiting EKG for final determination)  - ruled out for ACS with negative troponins <6 x3  -EKG reviewed, nsr, no ischemic st/t changes  -Echo done, result pending  -pharm nuclear stress test normal LVEF 65%, no ischemia/infarct  -A1C 6.3 c/w pre-DM  - Dispo: DC home today on asa and atorvastatin 40 mg hs  Time spent on discharge 31 minutes coordinating discharge plan and discussing with patient and family. Currently asymptomatic. trop negative. HEART score at least 3 (awaiting EKG for final determination)  - ruled out for ACS with negative troponins <6 x3  -EKG reviewed, nsr, no ischemic st/t changes  -Echo (9/13) unremarkable, normal LV/RV function  -pharm nuclear stress test (9/13) normal LVEF 65%, no ischemia/infarct  -A1C 6.3 c/w pre-DM  - Dispo: DC home today on asa and atorvastatin 40 mg hs  Time spent on discharge 31 minutes coordinating discharge plan and discussing with patient and family.

## 2023-09-13 NOTE — DISCHARGE NOTE PROVIDER - NSDCCPCAREPLAN_GEN_ALL_CORE_FT
PRINCIPAL DISCHARGE DIAGNOSIS  Diagnosis: Diaphoresis  Assessment and Plan of Treatment: you were ruled out for heart attack or significant heart diseae with negative echocardiogram and stress test, you were started on atorvastatin for high cholesterol and also can take baby aspirin daily; please follow up with your primary physician

## 2023-09-13 NOTE — DISCHARGE NOTE PROVIDER - HOSPITAL COURSE
53M with HLD present with exertional diaphoresis, dizziness and tiredness.  Patient was at work and exerting himself and started feeling sweaty and weak all of a sudden.  Now admitted for possible high grade angina equivalent.       Problem/Plan - 1:  ·  Problem: Angina pectoris.   ·  Plan: Currently asymptomatic. trop negative. HEART score at least 3 (awaiting EKG for final determination)  - ruled out for ACS with negative troponins <6 x3  -EKG reviewed, nsr, no ischemic st/t changes  -Echo done  -pharm nuclear stress test normal LVEF 65%, no ischemia/infarct  -A1C 6.3 c/w pre-DM  - Dispo: DC home on asa and atorvastatin 40 mg hs       Problem/Plan - 2:  ·  Problem: Hyperlipidemia, unspecified hyperlipidemia type.   ·  Plan: c/w atorvastatin , reduce to 40mg hs   - lipid profile: , chol 224.   53M with HLD present with exertional diaphoresis, dizziness and tiredness.  Patient was at work and exerting himself and started feeling sweaty and weak all of a sudden.  Now admitted for possible high grade angina equivalent.       Problem/Plan - 1:  ·  Problem: Angina pectoris.   ·  Plan: Currently asymptomatic. trop negative. HEART score at least 3 (awaiting EKG for final determination)  - ruled out for ACS with negative troponins <6 x3  -EKG reviewed, nsr, no ischemic st/t changes  -Echo(9/13) unremarkable, normal LV/RV function  -pharm nuclear stress test (9/13) normal LVEF 65%, no ischemia/infarct  -A1C 6.3 c/w pre-DM  - Dispo: DC home on asa and atorvastatin 40 mg hs       Problem/Plan - 2:  ·  Problem: Hyperlipidemia, unspecified hyperlipidemia type.   ·  Plan: c/w atorvastatin , reduce to 40mg hs   - lipid profile: , chol 224.

## 2023-09-13 NOTE — PROGRESS NOTE ADULT - SUBJECTIVE AND OBJECTIVE BOX
Dr. 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 650 milliGRAM(s) Oral every 6 hours PRN Temp greater or equal to 38C (100.4F), Mild Pain (1 - 3)  aluminum hydroxide/magnesium hydroxide/simethicone Suspension 30 milliLiter(s) Oral every 4 hours PRN Dyspepsia  melatonin 3 milliGRAM(s) Oral at bedtime PRN Insomnia  ondansetron Injectable 4 milliGRAM(s) IV Push every 8 hours PRN Nausea and/or Vomiting      CAPILLARY BLOOD GLUCOSE        I&O's Summary      PHYSICAL EXAM:  Vital Signs Last 24 Hrs  T(C): 36.4 (13 Sep 2023 12:11), Max: 36.9 (12 Sep 2023 15:14)  T(F): 97.5 (13 Sep 2023 12:11), Max: 98.5 (12 Sep 2023 15:14)  HR: 66 (13 Sep 2023 12:11) (50 - 66)  BP: 107/72 (13 Sep 2023 12:11) (100/68 - 111/69)  BP(mean): 84 (12 Sep 2023 23:45) (84 - 84)  RR: 19 (13 Sep 2023 12:11) (16 - 19)  SpO2: 98% (13 Sep 2023 12:11) (91% - 99%)    Parameters below as of 13 Sep 2023 12:11  Patient On (Oxygen Delivery Method): room air      CONSTITUTIONAL: NAD, well-developed  RESPIRATORY: Normal respiratory effort; lungs are clear to auscultation bilaterally  CARDIOVASCULAR: Regular rate and rhythm, normal S1 and S2, no murmur/rub/gallop; No lower extremity edema; Peripheral pulses are 2+ bilaterally  ABDOMEN: Nontender to palpation, normoactive bowel sounds, no rebound/guarding; No hepatosplenomegaly  MUSCULOSKELETAL: no clubbing or cyanosis of digits; no joint swelling or tenderness to palpation  PSYCH: A+O to person, place, and time; affect appropriate    LABS:                        14.2   8.92  )-----------( 253      ( 13 Sep 2023 10:20 )             41.6     09-13    139  |  103  |  10  ----------------------------<  195<H>  3.9   |  23  |  0.78    Ca    8.8      13 Sep 2023 10:20  Mg     2.10     09-13    TPro  7.5  /  Alb  4.2  /  TBili  0.5  /  DBili  x   /  AST  26  /  ALT  31  /  AlkPhos  123<H>  09-13    PT/INR - ( 13 Sep 2023 10:20 )   PT: 10.9 sec;   INR: 0.96 ratio         PTT - ( 13 Sep 2023 10:20 )  PTT:26.5 sec      Urinalysis Basic - ( 13 Sep 2023 10:20 )    Color: x / Appearance: x / SG: x / pH: x  Gluc: 195 mg/dL / Ketone: x  / Bili: x / Urobili: x   Blood: x / Protein: x / Nitrite: x   Leuk Esterase: x / RBC: x / WBC x   Sq Epi: x / Non Sq Epi: x / Bacteria: x      RADIOLOGY & ADDITIONAL TESTS:  Results Reviewed:   Imaging Personally Reviewed:  < from: Nuclear Stress Test-Exercise (09.13.23 @ 10:51) >  * The left ventricle was normal in size.  * Tracer uptake was homogeneous throughout the left  ventricle.  * Normal study; no evidence for myocardial infarction or  ischemia.  * Gated wall motion analysis was performed, and shows  normal wall motion.  -------------------------------------      Electrocardiogram Personally Reviewed:    COORDINATION OF CARE:  Care Discussed with Consultants/Other Providers [Y/N]: acp, dc home if echo and stress test negative   Prior or Outpatient Records Reviewed [Y/N]:   [de-identified] : Ms. Carvajal is a 50-year-old F returns today s/p VSG in 2017, lost approximately 70 lbs. but has regained approximately 45 lbs. States she still has restriction but desire for sweet has increased. Would make sense to send her to medical therapy to see if something like topiramate and GLP analog can be effective. Does complain of some reflux symptoms, so will get UGI series and EGD as well.

## 2023-09-13 NOTE — DISCHARGE NOTE PROVIDER - CARE PROVIDER_API CALL
Karl, [address], [facility]  [place] [id number]  Phone: [phone]  Fax: [phone]  Follow Up Time:

## 2023-09-13 NOTE — DISCHARGE NOTE NURSING/CASE MANAGEMENT/SOCIAL WORK - PATIENT PORTAL LINK FT
You can access the FollowMyHealth Patient Portal offered by [facility] by registering at the following website: [email or web address]. By joining [employer]’s FollowMyHealth portal, you will also be able to view your health information using other applications (apps) compatible with our system.

## 2023-09-13 NOTE — PATIENT PROFILE ADULT - FALL HARM RISK - HARM RISK INTERVENTIONS
Assistance with ambulation/Assistance OOB with selected safe patient handling equipment/Communicate Risk of Fall with Harm to all staff/Monitor gait and stability/Reinforce activity limits and safety measures with patient and family/Sit up slowly, dangle for a short time, stand at bedside before walking/Tailored Fall Risk Interventions/Visual Cue: Yellow wristband and red socks/Bed in lowest position, wheels locked, appropriate side rails in place/Call bell, personal items and telephone in reach/Instruct patient to call for assistance before getting out of bed or chair/Non-slip footwear when patient is out of bed/[place] to call system/Physically safe environment - no spills, clutter or unnecessary equipment/Purposeful Proactive Rounding/Room/bathroom lighting operational, light cord in reach

## 2023-09-13 NOTE — DISCHARGE NOTE NURSING/CASE MANAGEMENT/SOCIAL WORK - NSDCPEFALRISK_GEN_ALL_CORE
For information on Fall & Injury Prevention, visit: [email or web address] OR  [email or web address] OR  https://www.cdc.gov/steadi/patient.html

## 2023-09-13 NOTE — DISCHARGE NOTE PROVIDER - NS AS DC PROVIDER CONTACT Y/N MULTI
11/25/2022    PRIMARY: Nilesh Montes De Oca MD    ASSESSMENT:    1. Enlarged RV (right ventricle)    2. Ascending aorta dilation (CMS/HCC)    3. Chronotropic incompetence    4. Moderate obstructive sleep apnea        · Right ventricular enlargement unclear etiology.  CTA of the heart and coronaries negative.  Pulmonary pressures are normal.  Moderate obstructive sleep apnea newly diagnosed, may be the cause  · Mild pulmonary hypertension:  Resolved on the follow-up echo this month.    · Ascending aorta dilatation:  Stable on echo.  · Chronotropic incompetence: Asymptomatic.  Negative CTA of the coronary arteries    PLAN:  · He is awaiting arrival of his CPAP  · I will follow his ascending aorta.  In 1 year will do a noncontrast CT in the form of a coronary calcium score.  He has no family history of thoracic aortic aneurysm disease.  · Hypertension is controlled adequately    Patient Instructions   CT CAC noncontrast one year to monitor ascending aorta size    Giselle Martinez MA, Shannan MAYERS, Trisha BARRAGAN and I were pleased to meet with you today.     As part of your continued care with the [facility] Cardiology Team you may be scheduled with Cardiology Advanced Practice Providers(STEVE). Our APPs are Nurse Practitioners and Physician Assistants who are specifically trained to care for patients with Cardiac Conditions. We will all work closely together to provide you with a comprehensive patient centered experience.  Our current team of Cardiology APPs include:  • CLARI Sheffield  • CLARI Plaza  • Abigail Rajan PA-C  • CLARI Ventura    If you need refills - call your pharmacist and they will contact our office.   If you have medical concerns after hours, and to make appointments, call [phone].  If you have had testing done, anticipate results to be relayed to you within 3-5 days.  If you have not heard from our staff, please contact the office.     If you have a question during office hours call  [phone].  You will eventually speak with my nurses.  If you need to speak to me directly, let them know and I will get back to you as soon as possible.  Better yet, you can consider signing up for [employer], our popular online communication portal to ask for a refill or contact our staff.  Go to:  [facility] - [email or web address].    We encourage use of the patient portal to view results and communicate directly with staff with non-urgent concerns.    Dr. Melquiades Randhawa MD        No orders of the defined types were placed in this encounter.    Return in about 1 year (around 11/25/2023) for pre-clinic CAC.      Juan Navas is a 53 year old male here for evaluation of:  1. Enlarged RV (right ventricle)    2. Ascending aorta dilation (CMS/HCC)    3. Chronotropic incompetence    4. Moderate obstructive sleep apnea        SUBJECTIVE:  We discussed his follow-up echo.  RV remains enlarged but pulmonary pressures are normal.  Ascending aorta is dilated as it was before.    He was diagnosed with moderate sleep apnea this year.  He is awaiting arrival of the machine from the company.    11/25/2022: Per KEENA Desai, patient has no new cardiac complaints. He has some shortness of breath while laying flat at night. He has no chest pain, lightheadedness, or palpitations. He had a sleep study in July which showed moderate THOMAS. He has not received his CPAP yet. His blood pressure is controlled on lisinopril. He had echo completed 11/4 which showed normalized pulmonary pressures and EF of 64%.    Cardiac diagnostics:  Echo 11/2022: Normal left ventricular systolic function with ejection fraction of 65 %. Mildly increased left ventricular wall thickness.  Moderately enlarged right ventricular chamber size.  Normal right ventricular systolic pressure 22 mmHg. Normal right ventricular systolic function. Dilated ascending aorta, 4.3 cm.  compared to prior study the RVSP has nnormalized.  CTA Coronaries 1/2022:   1- Total calcium score of zero.    2- No evidence of coronary stenosis or plaque by Coronary CT Angiography.   3. Moderately dilated right ventricle. No evidence for a atrial septal  defect or anomalous pulmonary vein.  Stress test 11/19/2021: Inconclusive, submaximal stress test   Blunted heart rate response. Only able to achieve 67% of predicted maximum heart rate. No angina.  Echocardiogram 11/19/2021: lvef 64%. Grade I/IV diastolic dysfunction. Moderately increased right ventricular size and mildly decreased systolic function. RV fractional area change 28%. Mildly dilated ascending aorta 4.2 cm. Mild phtn.     ROS:Review of systems as above otherwise negative.    Current Outpatient Medications   Medication Sig   • aspirin (ECOTRIN) 81 MG EC tablet Take 81 mg by mouth daily.   • lisinopril (ZESTRIL) 20 MG tablet Take 1 tablet by mouth daily.   • acetaminophen (TYLENOL) 500 MG tablet Take 2 tablets by mouth every 6 hours as needed for Pain.   • FLAXSEED, LINSEED, PO Take 1 capsule by mouth daily.    • turmeric 500 MG capsule Take 500 mg by mouth daily.      No current facility-administered medications for this visit.       OBJECTIVE:  I have reviewed the patient's medications and allergies, problem list, past medical, surgical, social and family history, spending  time updating these as appropriate, especially the problem list.  See Histories section of the EMR for a display of this information.         PHYSICAL EXAM:  Vitals:    11/25/22 1138   BP: 116/80   Pulse: 74     BP Readings from Last 4 Encounters:   11/25/22 116/80   08/12/22 102/60   07/01/22 130/70   02/04/22 124/82     Wt Readings from Last 4 Encounters:   11/25/22 115.3 kg (254 lb 3.2 oz)   08/12/22 115.2 kg (254 lb)   07/01/22 116.6 kg (257 lb)   02/03/22 113.1 kg (249 lb 5.4 oz)     Body mass index is 32.64 kg/m².  Heart: Regular rate and rhythm, S1, S2 normal, no murmur, click, rub or gallop  General appearance: alert        LABS:  Recent Labs   Lab 02/04/22  0538 01/31/22  1329  01/20/22  1020   Sodium 138 136  --    Potassium 4.3 3.9  --    Chloride 102 101  --    Carbon Dioxide 27 23  --    BUN 14 12  --    Creatinine 1.00 0.87 0.82   Glucose 125* 101*  --    WBC 16.2*  --   --    HGB 12.8*  --   --    HCT 38.3*  --   --      --   --        Ejection Fraction   Date Value Ref Range Status   11/04/2022 64 % Final                 Melquiades Randhawa MD                                 Yes

## 2023-10-01 ENCOUNTER — EMERGENCY (EMERGENCY)
Facility: HOSPITAL | Age: 53
LOS: 1 days | Discharge: HOME | End: 2023-10-01
Attending: EMERGENCY MEDICINE
Payer: COMMERCIAL

## 2023-10-01 VITALS
RESPIRATION RATE: 17 BRPM | SYSTOLIC BLOOD PRESSURE: 128 MMHG | WEIGHT: 188.05 LBS | HEART RATE: 73 BPM | OXYGEN SATURATION: 98 % | HEIGHT: 67 IN | TEMPERATURE: 98 F | DIASTOLIC BLOOD PRESSURE: 84 MMHG

## 2023-10-01 VITALS
OXYGEN SATURATION: 99 % | SYSTOLIC BLOOD PRESSURE: 105 MMHG | HEART RATE: 62 BPM | RESPIRATION RATE: 20 BRPM | DIASTOLIC BLOOD PRESSURE: 69 MMHG | TEMPERATURE: 98 F

## 2023-10-01 DIAGNOSIS — Z98.89 OTHER SPECIFIED POSTPROCEDURAL STATES: Chronic | ICD-10-CM

## 2023-10-01 LAB
ALBUMIN SERPL ELPH-MCNC: 4.7 G/DL — SIGNIFICANT CHANGE UP (ref 3.3–5)
ALP SERPL-CCNC: 124 U/L — HIGH (ref 40–120)
ALT FLD-CCNC: 39 U/L — SIGNIFICANT CHANGE UP (ref 10–45)
ANION GAP SERPL CALC-SCNC: 12 MMOL/L — SIGNIFICANT CHANGE UP (ref 5–17)
AST SERPL-CCNC: 28 U/L — SIGNIFICANT CHANGE UP (ref 10–40)
BASOPHILS # BLD AUTO: 0.05 K/UL — SIGNIFICANT CHANGE UP (ref 0–0.2)
BASOPHILS NFR BLD AUTO: 0.6 % — SIGNIFICANT CHANGE UP (ref 0–2)
BILIRUB SERPL-MCNC: 0.4 MG/DL — SIGNIFICANT CHANGE UP (ref 0.2–1.2)
BUN SERPL-MCNC: 10 MG/DL — SIGNIFICANT CHANGE UP (ref 7–23)
CALCIUM SERPL-MCNC: 9.7 MG/DL — SIGNIFICANT CHANGE UP (ref 8.4–10.5)
CHLORIDE SERPL-SCNC: 104 MMOL/L — SIGNIFICANT CHANGE UP (ref 96–108)
CO2 SERPL-SCNC: 26 MMOL/L — SIGNIFICANT CHANGE UP (ref 22–31)
CREAT SERPL-MCNC: 0.94 MG/DL — SIGNIFICANT CHANGE UP (ref 0.5–1.3)
EGFR: 98 ML/MIN/1.73M2 — SIGNIFICANT CHANGE UP
EOSINOPHIL # BLD AUTO: 0.15 K/UL — SIGNIFICANT CHANGE UP (ref 0–0.5)
EOSINOPHIL NFR BLD AUTO: 1.7 % — SIGNIFICANT CHANGE UP (ref 0–6)
GLUCOSE SERPL-MCNC: 93 MG/DL — SIGNIFICANT CHANGE UP (ref 70–99)
HCT VFR BLD CALC: 42.3 % — SIGNIFICANT CHANGE UP (ref 39–50)
HGB BLD-MCNC: 14.3 G/DL — SIGNIFICANT CHANGE UP (ref 13–17)
IMM GRANULOCYTES NFR BLD AUTO: 0.3 % — SIGNIFICANT CHANGE UP (ref 0–0.9)
LACTATE BLDV-MCNC: 0.8 MMOL/L — SIGNIFICANT CHANGE UP (ref 0.5–2)
LIDOCAIN IGE QN: 42 U/L — SIGNIFICANT CHANGE UP (ref 7–60)
LYMPHOCYTES # BLD AUTO: 2.91 K/UL — SIGNIFICANT CHANGE UP (ref 1–3.3)
LYMPHOCYTES # BLD AUTO: 33.9 % — SIGNIFICANT CHANGE UP (ref 13–44)
MCHC RBC-ENTMCNC: 29.5 PG — SIGNIFICANT CHANGE UP (ref 27–34)
MCHC RBC-ENTMCNC: 33.8 GM/DL — SIGNIFICANT CHANGE UP (ref 32–36)
MCV RBC AUTO: 87.4 FL — SIGNIFICANT CHANGE UP (ref 80–100)
MONOCYTES # BLD AUTO: 0.65 K/UL — SIGNIFICANT CHANGE UP (ref 0–0.9)
MONOCYTES NFR BLD AUTO: 7.6 % — SIGNIFICANT CHANGE UP (ref 2–14)
NEUTROPHILS # BLD AUTO: 4.8 K/UL — SIGNIFICANT CHANGE UP (ref 1.8–7.4)
NEUTROPHILS NFR BLD AUTO: 55.9 % — SIGNIFICANT CHANGE UP (ref 43–77)
NRBC # BLD: 0 /100 WBCS — SIGNIFICANT CHANGE UP (ref 0–0)
PLATELET # BLD AUTO: 303 K/UL — SIGNIFICANT CHANGE UP (ref 150–400)
POTASSIUM SERPL-MCNC: 3.8 MMOL/L — SIGNIFICANT CHANGE UP (ref 3.5–5.3)
POTASSIUM SERPL-SCNC: 3.8 MMOL/L — SIGNIFICANT CHANGE UP (ref 3.5–5.3)
PROT SERPL-MCNC: 8.2 G/DL — SIGNIFICANT CHANGE UP (ref 6–8.3)
RBC # BLD: 4.84 M/UL — SIGNIFICANT CHANGE UP (ref 4.2–5.8)
RBC # FLD: 12.3 % — SIGNIFICANT CHANGE UP (ref 10.3–14.5)
SODIUM SERPL-SCNC: 142 MMOL/L — SIGNIFICANT CHANGE UP (ref 135–145)
WBC # BLD: 8.59 K/UL — SIGNIFICANT CHANGE UP (ref 3.8–10.5)
WBC # FLD AUTO: 8.59 K/UL — SIGNIFICANT CHANGE UP (ref 3.8–10.5)

## 2023-10-01 PROCEDURE — 99285 EMERGENCY DEPT VISIT HI MDM: CPT

## 2023-10-01 PROCEDURE — 83605 ASSAY OF LACTIC ACID: CPT

## 2023-10-01 PROCEDURE — 74177 CT ABD & PELVIS W/CONTRAST: CPT | Mod: MA

## 2023-10-01 PROCEDURE — 83690 ASSAY OF LIPASE: CPT

## 2023-10-01 PROCEDURE — 71045 X-RAY EXAM CHEST 1 VIEW: CPT

## 2023-10-01 PROCEDURE — 74177 CT ABD & PELVIS W/CONTRAST: CPT | Mod: 26,MA

## 2023-10-01 PROCEDURE — 99284 EMERGENCY DEPT VISIT MOD MDM: CPT | Mod: 25

## 2023-10-01 PROCEDURE — 96375 TX/PRO/DX INJ NEW DRUG ADDON: CPT

## 2023-10-01 PROCEDURE — 85025 COMPLETE CBC W/AUTO DIFF WBC: CPT

## 2023-10-01 PROCEDURE — 96374 THER/PROPH/DIAG INJ IV PUSH: CPT | Mod: XU

## 2023-10-01 PROCEDURE — 71045 X-RAY EXAM CHEST 1 VIEW: CPT | Mod: 26

## 2023-10-01 PROCEDURE — 80053 COMPREHEN METABOLIC PANEL: CPT

## 2023-10-01 RX ORDER — MORPHINE SULFATE 50 MG/1
4 CAPSULE, EXTENDED RELEASE ORAL ONCE
Refills: 0 | Status: DISCONTINUED | OUTPATIENT
Start: 2023-10-01 | End: 2023-10-01

## 2023-10-01 RX ORDER — ONDANSETRON 8 MG/1
4 TABLET, FILM COATED ORAL ONCE
Refills: 0 | Status: COMPLETED | OUTPATIENT
Start: 2023-10-01 | End: 2023-10-01

## 2023-10-01 RX ORDER — SODIUM CHLORIDE 9 MG/ML
1000 INJECTION INTRAMUSCULAR; INTRAVENOUS; SUBCUTANEOUS ONCE
Refills: 0 | Status: COMPLETED | OUTPATIENT
Start: 2023-10-01 | End: 2023-10-01

## 2023-10-01 RX ORDER — POLYETHYLENE GLYCOL 3350 17 G/17G
17 POWDER, FOR SOLUTION ORAL ONCE
Refills: 0 | Status: COMPLETED | OUTPATIENT
Start: 2023-10-01 | End: 2023-10-01

## 2023-10-01 RX ADMIN — MORPHINE SULFATE 4 MILLIGRAM(S): 50 CAPSULE, EXTENDED RELEASE ORAL at 17:07

## 2023-10-01 RX ADMIN — SODIUM CHLORIDE 1000 MILLILITER(S): 9 INJECTION INTRAMUSCULAR; INTRAVENOUS; SUBCUTANEOUS at 17:08

## 2023-10-01 RX ADMIN — POLYETHYLENE GLYCOL 3350 17 GRAM(S): 17 POWDER, FOR SOLUTION ORAL at 19:15

## 2023-10-01 RX ADMIN — ONDANSETRON 4 MILLIGRAM(S): 8 TABLET, FILM COATED ORAL at 17:06

## 2023-10-01 NOTE — ED PROVIDER NOTE - NSFOLLOWUPINSTRUCTIONS_ED_ALL_ED_FT
No signs of emergency medical condition on today's workup.  Presumptive diagnosis made, but further evaluation may be required by your primary care doctor or specialist for a definitive diagnosis.  Therefore, follow up as directed and if symptoms change/worsen or any emergency conditions, please return to the ER.    YOU WERE SEEN FOR acute rectal pain    YOU HAD labs and imaging done.   You can take ibuprofen 400mg every 6 to 8 hours and Tylenol 1000mg every 6 to 8 hours as needed for pain.   Please follow up with a rectal surgeon. There is a number that you can call in discharge paperwork.   You can take Miralax for constipation.     FOLLOW UP WITH a rectal surgeon.     RETURN TO THE EMERGENCY DEPARTMENT FOR worsening pain, fever, vomiting, or any new/concerning symptoms.

## 2023-10-01 NOTE — ED PROVIDER NOTE - PROGRESS NOTE DETAILS
Mike Holland, PGY2 - CT abdomen/pelvs w/ IV contrast negative for acute findings. Will give rectal surgery f/u and will dc with instruction to take Miralax as needed for constipation.

## 2023-10-01 NOTE — ED PROVIDER NOTE - OBJECTIVE STATEMENT
53-year-old  no significant past medical history besides GERD , HLD and status post hernia repair came in complaining of constipation, rectal pressure, blood in the stool, for the last 2 to 3-month   he was seen by GI Dr Bruce who prescribed him Protonix which he is taking since July 21, he continued having same symptoms now have nausea, and low appetite, he states with his brother was diagnosed with leukemia but no colon cancer in the family with GI malignancy No colonoscopy or endoscopy ever done

## 2023-10-01 NOTE — ED PROVIDER NOTE - PATIENT PORTAL LINK FT
You can access the FollowMyHealth Patient Portal offered by [facility] by registering at the following website: [email or web address]. By joining [facility]’s FollowMyHealth portal, you will also be able to view your health information using other applications (apps) compatible with our system.

## 2023-10-01 NOTE — ED ADULT NURSE NOTE - OBJECTIVE STATEMENT
53 yr old male with no medical issues came in stating for a week when he has a bowel movement he see blood and has pain when he has a movement. on assessment a and o x 3 lungs clear abd soft non tender no swelling in extremities no n/v/d no fevers, no bleeding unless he has a bowel movement. no other complaints or issues.

## 2023-10-01 NOTE — ED PROVIDER NOTE - PHYSICAL EXAMINATION
General: NAD  HEENT: NCAT.   Cardiac: RRR, 2+ radial pulses  Chest: CTA  Abdomen: soft, non-distended, no ttp, no rebound or guarding  Rectal exam: Chaperoned by Delmis ANAYA. No obvious mass appreciated externally. Brown stool. Good rectal tone. No bleeding. No obvious mass/hemorrhoids felt on rectal exam.   Extremities: no peripheral edema, calf tenderness, or leg size discrepancies  Skin: no rashes  Neuro: AAOx3, motor and sensory grossly intact.

## 2023-10-01 NOTE — ED ADULT TRIAGE NOTE - BSA (M2)
1.97 Pt reports good appetite and PO intake at home; reports not following any type of diet or restriction. Confirms NKFA. Pt reports not taking any vitamins or nutritional supplements PTA. Denies obtaining daily weights at home; states not having a scale.     Confirmed information with PCA. Pt reports good appetite and PO intake. PCA reports pt consumes 100% of meals. Pt denies difficulty chewing/swallowing. Pt denies nausea, vomiting, diarrhea, or constipation, last BM today (11/11). Pt denies weight changes PTA, reports clothes fit him the same way, states UBW "300 something". Weight as per previous RD note (10/10/2019) 369 pounds -?accuracy. Weight as per flow sheets (11/10) 321 pounds -> (11/11) 319.6 pounds - ?accuracy due to fluid accumulation, will continue to monitor.     Provided education on heart failure and weight loss nutrition therapy. Recommended limited salt intake. Reviewed foods high in salt and amount of salt recommended per day. Discussed nutrition label reading. Stressed the importance of limited fried foods and saturated fat consumption. Discussed the importance of daily weights at home and weight gain parameters for contacting MD. Described the benefits of gradual weight loss in overall health, discussed groups of foods and portion sizes, encouraged water consumption with avoidance of juice or soda. Pt amenable for education - made aware RD remains available. Pt reports "very" good appetite and PO intake at home; reports not following any type of diet or restriction. Confirms NKFA. Pt reports not taking any vitamins or nutritional supplements PTA. Denies obtaining daily weights at home; states not having a scale.     Confirmed information with PCA. Pt reports good appetite and PO intake. PCA reports pt consumes 100% of meals. Pt denies difficulty chewing/swallowing. Pt denies nausea, vomiting, diarrhea, or constipation, last BM today (11/11). Pt denies weight changes PTA, reports clothes fit him the same way, states UBW "300 something". Weight as per previous RD note (10/10/2019) 369 pounds -?accuracy. Weight as per flow sheets (11/10) 321 pounds -> (11/11) 319.6 pounds - ?accuracy due to fluid accumulation, will continue to monitor.     Provided education on heart failure and weight loss nutrition therapy. Recommended limited salt intake. Reviewed foods high in salt and amount of salt recommended per day. Discussed nutrition label reading. Stressed the importance of limited fried foods and saturated fat consumption. Discussed the importance of daily weights at home and weight gain parameters for contacting MD. Described the benefits of gradual weight loss in overall health, discussed groups of foods and portion sizes, encouraged water consumption with avoidance of juice or soda. Pt amenable for education - made aware RD remains available.

## 2023-10-01 NOTE — ED PROVIDER NOTE - CARE PROVIDER_API CALL
Severiano Shipman  Colon/Rectal Surgery  [address], [address]  [place] [address]  Phone: [phone]  Fax: [phone]  Follow Up Time: Urgent

## 2023-10-01 NOTE — ED PROVIDER NOTE - CLINICAL SUMMARY MEDICAL DECISION MAKING FREE TEXT BOX
53-year-old male with rectal pressure, constipation, blood in the stool, concerning for malignancy, versus perirectal abscess, versus hemorrhoid or fissure , no fever or chills ,no weight loss   will obtain blood work, CAT scan of the abdomen and pelvis IV hydration, pain medication and reassess ZR

## 2023-10-01 NOTE — ED ADULT NURSE NOTE - NSFALLHARMRISKINTERV_ED_ALL_ED

## 2023-10-13 ENCOUNTER — APPOINTMENT (OUTPATIENT)
Dept: UROLOGY | Facility: CLINIC | Age: 53
End: 2023-10-13
Payer: COMMERCIAL

## 2023-10-13 VITALS
RESPIRATION RATE: 16 BRPM | HEART RATE: 60 BPM | OXYGEN SATURATION: 96 % | TEMPERATURE: 98.8 F | DIASTOLIC BLOOD PRESSURE: 75 MMHG | SYSTOLIC BLOOD PRESSURE: 115 MMHG

## 2023-10-13 DIAGNOSIS — N50.819 TESTICULAR PAIN, UNSPECIFIED: ICD-10-CM

## 2023-10-13 DIAGNOSIS — N43.3 HYDROCELE, UNSPECIFIED: ICD-10-CM

## 2023-10-13 DIAGNOSIS — K62.89 OTHER SPECIFIED DISEASES OF ANUS AND RECTUM: ICD-10-CM

## 2023-10-13 DIAGNOSIS — Z80.6 FAMILY HISTORY OF LEUKEMIA: ICD-10-CM

## 2023-10-13 DIAGNOSIS — Z87.438 PERSONAL HISTORY OF OTHER DISEASES OF MALE GENITAL ORGANS: ICD-10-CM

## 2023-10-13 PROCEDURE — 99203 OFFICE O/P NEW LOW 30 MIN: CPT

## 2023-10-15 LAB
APPEARANCE: CLEAR
BACTERIA UR CULT: NORMAL
BACTERIA: NEGATIVE /HPF
BILIRUBIN URINE: NEGATIVE
BLOOD URINE: NEGATIVE
CAST: 0 /LPF
COLOR: YELLOW
EPITHELIAL CELLS: 0 /HPF
GLUCOSE QUALITATIVE U: NEGATIVE MG/DL
KETONES URINE: NEGATIVE MG/DL
LEUKOCYTE ESTERASE URINE: NEGATIVE
MICROSCOPIC-UA: NORMAL
NITRITE URINE: NEGATIVE
PH URINE: 6
PROTEIN URINE: NEGATIVE MG/DL
RED BLOOD CELLS URINE: 1 /HPF
SPECIFIC GRAVITY URINE: 1.01
UROBILINOGEN URINE: 0.2 MG/DL
WHITE BLOOD CELLS URINE: 0 /HPF

## 2023-10-22 PROBLEM — N43.3 HYDROCELE, BILATERAL: Status: RESOLVED | Noted: 2017-06-21 | Resolved: 2023-10-22

## 2023-10-22 PROBLEM — K62.89 RECTAL PAIN: Status: ACTIVE | Noted: 2023-10-22

## 2023-10-22 PROBLEM — N43.3 HYDROCELE OF TESTIS: Status: RESOLVED | Noted: 2017-06-21 | Resolved: 2023-10-22

## 2023-10-22 PROBLEM — Z87.438 HISTORY OF EPIDIDYMITIS: Status: RESOLVED | Noted: 2017-01-23 | Resolved: 2023-10-22

## 2023-10-22 PROBLEM — N50.819 TESTICLE PAIN: Status: RESOLVED | Noted: 2017-02-22 | Resolved: 2023-10-22

## 2024-02-05 NOTE — ED ADULT TRIAGE NOTE - ACCOMPANIED BY
MRI consistent with discitis/osteomyelitis of L1-2, L4-5, L5-S1 regions.  Neurosurgery consulted  Per ER discussion with neurosurgery, obtain blood cultures and hold off on antibiotics pending results unless patient clinically declines  WBC WNL, afebrile  Consider transfer if patient becomes unstable/develops red flag symptoms however exam remains stable  Prior provider discussed with ID - recommend IR consult   Performed 1/30 for path  Performed again 2/1 with culture - no growth  Blood cultures negative x2 negative to date  Discussed with ID and neurosurgery 2/3 - given elevated ESR/CRP and imaging findings, will empirically treat for OM/discitis.  Started on IV cefepime/vancomycin.  Will need PICC line placed - consent placed on chart 2/4.    Patient medically stable for discharge pending PICC placement and arrangement of IV abx    Spouse/Significant other

## 2024-03-13 ENCOUNTER — APPOINTMENT (OUTPATIENT)
Dept: COLORECTAL SURGERY | Facility: CLINIC | Age: 54
End: 2024-03-13
Payer: COMMERCIAL

## 2024-03-13 VITALS
RESPIRATION RATE: 12 BRPM | BODY MASS INDEX: 26.98 KG/M2 | HEIGHT: 68 IN | DIASTOLIC BLOOD PRESSURE: 80 MMHG | HEART RATE: 62 BPM | SYSTOLIC BLOOD PRESSURE: 130 MMHG | WEIGHT: 178 LBS | TEMPERATURE: 97.8 F

## 2024-03-13 DIAGNOSIS — L29.0 PRURITUS ANI: ICD-10-CM

## 2024-03-13 DIAGNOSIS — K62.5 HEMORRHAGE OF ANUS AND RECTUM: ICD-10-CM

## 2024-03-13 DIAGNOSIS — Z87.438 PERSONAL HISTORY OF OTHER DISEASES OF MALE GENITAL ORGANS: ICD-10-CM

## 2024-03-13 DIAGNOSIS — N21.0 CALCULUS IN BLADDER: ICD-10-CM

## 2024-03-13 PROCEDURE — 99203 OFFICE O/P NEW LOW 30 MIN: CPT | Mod: 25

## 2024-03-13 PROCEDURE — 45300 PROCTOSIGMOIDOSCOPY DX: CPT

## 2024-03-13 RX ORDER — NYSTATIN 100000 1/G
100000 POWDER TOPICAL TWICE DAILY
Qty: 1 | Refills: 2 | Status: ACTIVE | COMMUNITY
Start: 2024-03-13 | End: 1900-01-01

## 2024-03-13 RX ORDER — CIPROFLOXACIN HYDROCHLORIDE 500 MG/1
500 TABLET, FILM COATED ORAL
Qty: 28 | Refills: 0 | Status: DISCONTINUED | COMMUNITY
Start: 2017-01-23 | End: 2024-03-13

## 2024-03-13 RX ORDER — TIZANIDINE 4 MG/1
4 TABLET ORAL
Qty: 30 | Refills: 11 | Status: DISCONTINUED | COMMUNITY
Start: 2017-05-24 | End: 2024-03-13

## 2024-03-13 RX ORDER — HYDROCORTISONE ACETATE 25 MG/1
25 SUPPOSITORY RECTAL
Qty: 1 | Refills: 2 | Status: ACTIVE | COMMUNITY
Start: 2024-03-13 | End: 1900-01-01

## 2024-03-13 RX ORDER — CLOTRIMAZOLE AND BETAMETHASONE DIPROPIONATE 10; .5 MG/G; MG/G
1-0.05 CREAM TOPICAL TWICE DAILY
Qty: 1 | Refills: 2 | Status: ACTIVE | COMMUNITY
Start: 2024-03-13 | End: 1900-01-01

## 2024-03-14 NOTE — ASSESSMENT
[FreeTextEntry1] : 53-year-old  male who presents with multiple anorectal complaints.  Patient states that he had an antibiotic induced diarrhea over a year ago.  This seemed to respond to a course of additional oral medication.  Subsequent to this he has had intermittent abdominal cramping.  This has been treated with an antispasmodic.  He presents today with complaints of bright red rectal bleeding with bowel movements and significant perianal itch.  On further questioning he does have a significant amount of caffeinated product intake on a daily basis.  He did have a colonoscopy last year which was unremarkable.  Inspection of the anorectal area reveals no overt irritative perianal skin changes.  On digital examination his resting sphincter tone is extremely high.  There is no mass palpable.  The digital exam itself is extremely uncomfortable for the patient.  Limited sigmoidoscopy to 12 cm is negative.  The mucosa appears normal and the stool is brown.  On withdrawal of the instrument he has sizable internal hemorrhoids.  Patient has 2 issues.  He has pruritus ani which we will treat conservatively by advising him to minimize caffeinated product intake.  I will also prescribe Lotrisone cream and Mycostatin powder to be applied locally twice daily.  I believe he has bleeding from his internal hemorrhoids and we will attempt to treat this conservatively by advising him to increase his fluid intake, use a daily bulk forming laxative, use a nightly Anusol suppository.  If bleeding continues, I would recommend a trial of hemorrhoidal banding.

## 2024-03-14 NOTE — REVIEW OF SYSTEMS
[As Noted in HPI] : as noted in HPI [Negative] : Endocrine [Chest Pain] : no chest pain [Easy Bleeding] : no tendency for easy bleeding [Easy Bruising] : no tendency for easy bruising [Shortness Of Breath] : no shortness of breath [FreeTextEntry5] : 2023 workup wnl [FreeTextEntry9] : joint pain

## 2024-03-14 NOTE — HISTORY OF PRESENT ILLNESS
[FreeTextEntry1] : 52yo male presents with complaints of rectal itch, burning, pressure with sitting/activity. Last colonoscopy 2023 (hemorrhoids).  Takes daily Miralax/Dicyclomine. BM 2x daily w/straining/bleeding. Applies proctocream. Bleeding persists. +caffeinated product intake. Presents for evaluation.

## 2024-03-14 NOTE — PHYSICAL EXAM
[Normal Breath Sounds] : Normal breath sounds [Normal Heart Sounds] : normal heart sounds [Normal Rate and Rhythm] : normal rate and rhythm [No Edema] : No edema [Alert] : alert [Oriented to Person] : oriented to person [Oriented to Place] : oriented to place [Oriented to Time] : oriented to time [Calm] : calm [de-identified] : round soft +BS NT/ND [de-identified] : NC/AT [de-identified] : +ROM [de-identified] : intact

## 2024-05-23 NOTE — ED POST DISCHARGE NOTE - RESULT SUMMARY
Infusion Nursing Note:  Nicolas Malone presents today for ivf.    Patient seen by provider today: No, yesterday Lulu Tavera   present during visit today: Not Applicable.    Note: N/A.      Intravenous Access:  Peripheral IV placed.    Treatment Conditions:  Not Applicable.      Post Infusion Assessment:  Patient tolerated infusion without incident.  Blood return noted pre and post infusion.  Site patent and intact, free from redness, edema or discomfort.  No evidence of extravasations.  Access discontinued per protocol.       Discharge Plan:   Patient and/or family verbalized understanding of discharge instructions and all questions answered.      ADAM BARROSO     pt called asking if there was any outstanding blood work. Informed all blood tests appeared to be back by discharge, discussed lab results and advised he will receive a call from referral coordinator w/ rectal surgeon appt. - Joseph Hinkle PA-C

## 2024-06-05 ENCOUNTER — APPOINTMENT (OUTPATIENT)
Dept: COLORECTAL SURGERY | Facility: CLINIC | Age: 54
End: 2024-06-05
Payer: COMMERCIAL

## 2024-06-05 PROCEDURE — 46221 LIGATION OF HEMORRHOID(S): CPT

## 2024-06-05 PROCEDURE — 99212 OFFICE O/P EST SF 10 MIN: CPT

## 2024-06-05 RX ORDER — HYDROCORTISONE ACETATE 25 MG/1
25 SUPPOSITORY RECTAL
Qty: 1 | Refills: 2 | Status: ACTIVE | COMMUNITY
Start: 2024-06-05 | End: 1900-01-01

## 2024-06-05 NOTE — HISTORY OF PRESENT ILLNESS
[FreeTextEntry1] : 53-year-old gentleman who presents for routine follow-up visit.  When I had initially seen the patient he had both pruritus ani and what I believed was bleeding internal hemorrhoids.  He states that the perianal itching has improved.  He still has some intermittent painless bright red rectal bleeding.  Patient seems reluctant to have banding performed. Therefore we will treat his internal hemorrhoids conservatively by advising him to drink 4 to 6 glasses of water or juice daily.  I want him to take a daily dose of Metamucil.  I will also prescribe Anusol suppositories nightly.  If his bleeding persists, we can always reconsider a trial of hemorrhoidal banding.

## 2024-12-04 NOTE — DISCHARGE NOTE NURSING/CASE MANAGEMENT/SOCIAL WORK - NSDPACMPNY_GEN_ALL_CORE
Family [FreeTextEntry1] : labs 8/20: hdl 221, trig 195, ldl 149, creat 1.86, CMP ok, cbc wnl, hga1c 5.0

## 2025-06-17 ENCOUNTER — NON-APPOINTMENT (OUTPATIENT)
Age: 55
End: 2025-06-17

## 2025-06-18 ENCOUNTER — APPOINTMENT (OUTPATIENT)
Dept: COLORECTAL SURGERY | Facility: CLINIC | Age: 55
End: 2025-06-18
Payer: COMMERCIAL

## 2025-06-18 PROCEDURE — 46221 LIGATION OF HEMORRHOID(S): CPT

## 2025-07-09 ENCOUNTER — APPOINTMENT (OUTPATIENT)
Dept: COLORECTAL SURGERY | Facility: CLINIC | Age: 55
End: 2025-07-09
Payer: COMMERCIAL

## 2025-07-09 PROCEDURE — 46221 LIGATION OF HEMORRHOID(S): CPT

## 2025-07-09 RX ORDER — NYSTATIN 100MM UNIT
POWDER (EA) MISCELLANEOUS
Qty: 1 | Refills: 2 | Status: ACTIVE | COMMUNITY
Start: 2025-07-09 | End: 1900-01-01

## 2025-08-06 ENCOUNTER — APPOINTMENT (OUTPATIENT)
Dept: COLORECTAL SURGERY | Facility: CLINIC | Age: 55
End: 2025-08-06
Payer: COMMERCIAL

## 2025-08-06 PROCEDURE — 99212 OFFICE O/P EST SF 10 MIN: CPT

## 2025-08-06 RX ORDER — HYDROCORTISONE ACETATE 25 MG/1
25 SUPPOSITORY RECTAL
Qty: 12 | Refills: 3 | Status: ACTIVE | COMMUNITY
Start: 2025-08-06 | End: 1900-01-01

## 2025-09-10 ENCOUNTER — APPOINTMENT (OUTPATIENT)
Dept: COLORECTAL SURGERY | Facility: CLINIC | Age: 55
End: 2025-09-10
Payer: COMMERCIAL

## 2025-09-10 PROCEDURE — 46221 LIGATION OF HEMORRHOID(S): CPT
